# Patient Record
Sex: FEMALE | Race: WHITE | Employment: FULL TIME | URBAN - METROPOLITAN AREA
[De-identification: names, ages, dates, MRNs, and addresses within clinical notes are randomized per-mention and may not be internally consistent; named-entity substitution may affect disease eponyms.]

---

## 2020-06-17 ENCOUNTER — OFFICE VISIT (OUTPATIENT)
Dept: NEUROLOGY | Age: 65
End: 2020-06-17

## 2020-06-17 VITALS
BODY MASS INDEX: 29.23 KG/M2 | SYSTOLIC BLOOD PRESSURE: 140 MMHG | HEIGHT: 63 IN | OXYGEN SATURATION: 97 % | DIASTOLIC BLOOD PRESSURE: 80 MMHG | WEIGHT: 165 LBS | RESPIRATION RATE: 18 BRPM | HEART RATE: 64 BPM

## 2020-06-17 DIAGNOSIS — M54.31 SCIATICA, RIGHT SIDE: Primary | ICD-10-CM

## 2020-06-17 NOTE — PATIENT INSTRUCTIONS
PRESCRIPTION REFILL POLICY [facility] Statement to Patients April 1, 2014 In an effort to ensure the large volume of patient prescription refills is processed in the most efficient and expeditious manner, we are asking our patients to assist us by calling your Pharmacy for all prescription refills, this will include also your  Mail Order Pharmacy. The pharmacy will contact our office electronically to continue the refill process. Please do not wait until the last minute to call your pharmacy. We need at least 48 hours (2days) to fill prescriptions. We also encourage you to call your pharmacy before going to  your prescription to make sure it is ready. With regard to controlled substance prescription refill requests (narcotic refills) that need to be picked up at our office, we ask your cooperation by providing us with at least 72 hours (3days) notice that you will need a refill. We will not refill narcotic prescription refill requests after 4:00pm on any weekday, Monday through Thursday, or after 2:00pm on Fridays, or on the weekends. We encourage everyone to explore another way of getting your prescription refill request processed using Unda, our patient web portal through our electronic medical record system. Unda is an efficient and effective way to communicate your medication request directly to the office and  downloadable as an marti on your smart phone . Unda also features a review functionality that allows you to view your medication list as well as leave messages for your physician. Are you ready to get connected? If so please review the attatched instructions or speak to any of our staff to get you set up right away! Thank you so much for your cooperation. Should you have any questions please contact our Practice Administrator. The Physicians and Staff,  [facility]

## 2020-06-17 NOTE — PROGRESS NOTES
Ms. Teresa Kenyon presents as a new patient for evaluation of bilateral lower extremity pain/numbness and back pain. Depression screening done on patient.

## 2020-06-17 NOTE — PROGRESS NOTES
Chief Complaint   Patient presents with    Neurologic Problem         HISTORY OF PRESENT ILLNESS  Corina Cannon is a 71 y.o. female came in for neurological evaluation regarding low back pain that is radiating down into the right lower extremity. She started noticing it about 6 weeks ago as she was mowing her grass. Initially it was localized to the hip region but then it progressed on and then she developed back pain. Bending over aggravates the pain. She has been walking somewhat slower and at times tends to drag her right leg because of the discomfort. Denies any numbness in the leg. She had x-ray and was given a course of Medrol which helped for a few days but then the pain came back. She has also been doing home exercises for the past 4 weeks but she finds them quite painful and have not helped. Past Medical History:   Diagnosis Date    Chronic kidney disease     kidney stone    GERD (gastroesophageal reflux disease)     Other ill-defined conditions(489.83)     environmental allergies    Other ill-defined conditions(809.19)     guillain [place]    Thyroid disease      Current Outpatient Medications   Medication Sig    montelukast (SINGULAIR) 10 mg tablet     ibuprofen (MOTRIN) 600 mg tablet Take 1 Tab by mouth every six (6) hours as needed for Pain.  levothyroxine (LEVOXYL) 50 mcg tablet Take 50 mcg by mouth Daily (before breakfast).  traZODone (DESYREL) 100 mg tablet Take 100 mg by mouth nightly.  fluticasone (FLONASE) 50 mcg/actuation nasal spray 2 Sprays by Both Nostrils route daily.  b complex vitamins tablet Take 1 Tab by mouth daily.  fexofenadine (ALLEGRA) 180 mg tablet Take 180 mg by mouth daily.  ALPRAZolam (XANAX) 0.5 mg tablet      No current facility-administered medications for this visit.       No Known Allergies  Family History   Problem Relation Age of Onset    Hypertension Mother     Kidney Disease Mother         stone    Heart Disease Brother atrial fibrillation    Hypertension Brother     Other Father         GSW      Social History     Tobacco Use    Smoking status: Never Smoker    Smokeless tobacco: Never Used   Substance Use Topics    Alcohol use: Yes     Comment: once a yr at most    Drug use: No     Past Surgical History:   Procedure Laterality Date    HX CHOLECYSTECTOMY      HX HEENT      oral surgery to pull teeth    HX ORTHOPAEDIC      ORIF left ankle - plate and screws         REVIEW OF SYSTEMS  Review of Systems - History obtained from the patient  Psychological ROS: negative  ENT ROS: negative  Hematological and Lymphatic ROS: negative  Endocrine ROS: negative  Respiratory ROS: no cough, shortness of breath, or wheezing  Cardiovascular ROS: no chest pain or dyspnea on exertion  Gastrointestinal ROS: no abdominal pain, change in bowel habits, or black or bloody stools  Genito-Urinary ROS: no dysuria, trouble voiding, or hematuria  Musculoskeletal ROS: negative  Dermatological ROS: negative      PHYSICAL EXAMINATION:    Visit Vitals  /80   Pulse 64   Resp 18   Ht 5' 3\" (1.6 m)   Wt 74.8 kg (165 lb)   SpO2 97%   BMI 29.23 kg/m²     General:  Well nourished and groomed individual in no acute distress. Neck: Supple, nontender, no bruits, no pain with resistance to active range of motion. Heart: Regular rate and rhythm. Normal S1S2. Lungs:  Equal chest expansion, no cough, no wheeze  Musculoskeletal:  Extremities revealed no edema and had full range of motion of joints. Psych:  Good mood and bright affect    NEUROLOGICAL EXAMINATION:     Mental Status:   Alert and oriented to person, place, and time with recent and remote memory intact. Attention span and concentration are normal. Speech is fluent. Cranial Nerves:    II, III, IV, VI:  Visual acuity grossly intact. Visual fields are normal.    Pupils are equal, round, and reactive to light and accommodation. Extra-ocular movements are full and fluid. Fundoscopic exam was benign, no ptosis or nystagmus. V-XII: Hearing is grossly intact. Facial features are symmetric, with normal sensation and strength. The palate rises symmetrically and the tongue protrudes midline. Sternocleidomastoids 5/5. Motor Examination: Normal tone, bulk, and strength. 5/5 muscle strength throughout. Right big toe extensors were weaker compared to the left. No cogwheel rigidity or clonus present. Sensory exam:  Normal throughout to pinprick, temperature, and vibration sense. Normal proprioception. Coordination:  Finger to nose and rapid arm movement testing was normal.   No resting or intention tremor    Gait and Station:  Steady but slow. She was able to walk on her toes but not on her heels. Normal arm swing. No Rhomberg or pronator drift. No muscle wasting or fasiculations noted. Reflexes:  DTRs 2+ throughout. Toes downgoing. Slight leg raise test was positive on the right      LABS / IMAGING  X-rays of the lower back were reportedly negative    ASSESSMENT    ICD-10-CM ICD-9-CM    1. Sciatica, right side M54.31 724.3 MRI LUMB SPINE WO CONT       DISCUSSION  Ms. Latrell Mehta has symptoms of sciatica on the right side with mild weakness of the big toe extensors and may have a radiculopathy at L5 level. Since she has had x-rays, tried home PT and even took a course of Medrol Dosepaks without any benefit, I have recommended MRI scan of the lumbar spine for further evaluation and to decide the course for treatment      Jorge Lopez MD  Diplomate, American Board of Psychiatry & Neurology (Neurology)  Diplomate, American Board of Psychiatry & Neurology (Clinical Neurophysiology)  Diplomate, American Board of Electrodiagnostic Medicine  This note will not be viewable in [address]

## 2020-06-19 ENCOUNTER — HOSPITAL ENCOUNTER (OUTPATIENT)
Dept: MRI IMAGING | Age: 65
Discharge: HOME | End: 2020-06-19
Attending: PSYCHIATRY & NEUROLOGY
Payer: MEDICARE

## 2020-06-19 DIAGNOSIS — M54.31 SCIATICA, RIGHT SIDE: ICD-10-CM

## 2020-06-19 PROCEDURE — 72148 MRI LUMBAR SPINE W/O DYE: CPT

## 2020-06-23 ENCOUNTER — TELEPHONE (OUTPATIENT)
Dept: NEUROLOGY | Age: 65
End: 2020-06-23

## 2020-06-23 NOTE — TELEPHONE ENCOUNTER
----- Message from Tao Meyer sent at 2020 12:34 PM EDT -----  Regarding: JOSE/TELEPHONE  Contact: [phone]  Return Call    Patient's first and last name: Scarlett Barger  : 1955  ID numbers:  [id number]    Caller's first and last name and relationship (if not the patient):   Best contact number(s): [phone]  Whose call is being returned: Unknown  Details to clarify the request: Patient returning a missed call regarding MRI results.

## 2020-06-24 DIAGNOSIS — M54.31 SCIATICA, RIGHT SIDE: Primary | ICD-10-CM

## 2020-06-24 RX ORDER — METHYLPREDNISOLONE 4 MG/1
TABLET ORAL
Qty: 1 DOSE PACK | Refills: 0 | Status: SHIPPED | OUTPATIENT
Start: 2020-06-24

## 2020-06-24 RX ORDER — DICLOFENAC SODIUM 50 MG/1
50 TABLET, DELAYED RELEASE ORAL
Qty: 30 TAB | Refills: 0 | Status: SHIPPED | OUTPATIENT
Start: 2020-06-24

## 2020-07-07 ENCOUNTER — HOSPITAL ENCOUNTER (OUTPATIENT)
Dept: PHYSICAL THERAPY | Age: 65
Discharge: HOME | End: 2020-07-07
Payer: MEDICARE

## 2020-07-07 PROCEDURE — 97110 THERAPEUTIC EXERCISES: CPT | Performed by: PHYSICAL THERAPIST

## 2020-07-07 PROCEDURE — 97161 PT EVAL LOW COMPLEX 20 MIN: CPT | Performed by: PHYSICAL THERAPIST

## 2020-07-07 NOTE — PROGRESS NOTES
PT INITIAL EVALUATION NOTE - [facility] 2-15    Patient Name: Leticia Sow  Date:2020  : 1955  [x]  Patient  Verified  Payor: VA MEDICARE / Plan: VA MEDICARE PART A & B / Product Type: Medicare /    In time:1130a  Out time: 1235p  Total Treatment Time (min): 65  Total Timed Codes (min): 10  1:1 Treatment Time ( [address] only): 10   Visit #: 1     Treatment Area: Right sided sciatica [M54.31]    SUBJECTIVE  Pain Level (0-10 scale): 6  Any medication changes, allergies to medications, adverse drug reactions, diagnosis change, or new procedure performed?: [] No    [x] Yes (see summary sheet for update)  Subjective:    Pt reports with low back and R LE pain that began back in mid-May. She was doing yardwork at the time and was bending over repeatedly to  sticks, and began to notice hip pain. It has progressed since then and she has back and R LE pain. Reports her R LE pain is continuing to worsen at this time. She is a resident of [facility], but is still here in [place] in part in order to get medical care. Previous medical history of [place] back in . Does has some residual numbness in her toes from her [place] and has noticed that when she is fatigued, she has R LE foot drop/dragging. This predates her low back/R LE symptoms.      Description of symptoms: central/R low back pain, R lateral LE pain   Aggravating Factors: sitting>standing  Alleviating Factors: getting up/walking around    Radiation: lateral thigh and lower leg pain, follows L5 dermatome    Patient reports functional limitations with:     OBJECTIVE/EXAMINATION  Posture:  Mild lateral shift to L  Other Observations:  Slow movement, guarded with bed mobility  Palpation: No specific TTP        Lumbar AROM:          R   L    Flexion    50% limited, pain R hip    Extension   50% limited, no pain      Side Bending   To knee  To knee, R sided tightness        LOWER QUARTER   MUSCLE STRENGTH  KEY       R  L  0 - No Contraction  L1, L2 Psoas  4+  4+  1 - Trace   L3 Quads  5  5  2 - Poor   L4 Tib Ant  5  4+  3 - Fair    L5 EHL  4  5  4 - Good   S1 Peroneals  5  5  5 - Normal   S2 Hams  5  5    Mobility Assessment: lumbar P-A mobility: mild hypomobility generally, increased dorsal foot pain with L5 P-A mob GIII     Lumbar rotational mobility: hypomobile, guarded, hip pain with generalized right rotation  MMT: R hip              HIP ER: 4+/5            Neurological: Sensations: intact to light touch  Special Tests: Forward Bend: limited, painful R LE       H.S. SLR: (+) for LE tightness @60deg hip flexion (popliteal space)    Slump: (+) for popliteal space 'tightness'      SI distraction: (+) for reduced pain   Sidelying gapping stretching (+) for reduced pain   Prone position: no increases in pain    10 min Therapeutic Exercise:  [x] See flow sheet :   Rationale: increase ROM to improve the patients ability to move with reduced pain    With   [] TE   [] TA   [] neuro   [] other: Patient Education: [x] Review HEP    [] Progressed/Changed HEP based on:   [] positioning   [] body mechanics   [] transfers   [] heat/ice application    [] other:      Other Objective/Functional Measures:  FOTO: unavailable    Pain Level (0-10 scale) post treatment: 6    ASSESSMENT/Changes in Function:     [x]  See Plan of HILLARY Rao DPT 7/7/2020

## 2020-07-07 NOTE — PROGRESS NOTES
[facility]  [address] [address]  Phone: [phone]  Fax: [phone]      Plan of Care/Statement of Necessity for Physical Therapy Services  2-15    Patient name: Maite Ricardo  : 1955  Provider#: [id number]  Referral source: Judi Bermudez MD      Medical/Treatment Diagnosis: Right sided sciatica [M54.31]     Prior Hospitalization: see medical history     Comorbidities: kidney stones, Guillain-[place]  Prior Level of Function: able to perform yardwork without issue  Medications: Verified on Patient Summary List  Start of Care: 2020      Onset Date: May 2020   The [address] [place] [address] and following information is based on the information from the initial evaluation. Assessment/ key information: Patient reports with symptoms indicative of lumbar radiculopathy affecting L5 nerve root. Exhibits an extension bias, and will work on lumbar mobility/decompression techniques to mitigate LE symptoms.      Evaluation Complexity History MEDIUM  Complexity : 1-2 comorbidities / personal factors will impact the outcome/ POC ; Examination HIGH Complexity : 4+ Standardized tests and measures addressing body structure, function, activity limitation and / or participation in recreation  ;Presentation LOW Complexity : Stable, uncomplicated   Overall Complexity Rating: LOW     Problem List: pain affecting function, decrease ROM, decrease strength, decrease ADL/ functional abilitiies, decrease activity tolerance and decrease flexibility/ joint mobility   Treatment Plan may include any combination of the following: Therapeutic exercise, Therapeutic activities, Neuromuscular re-education, Physical agent/modality, Manual therapy, Patient education and Self Care training  Patient / Family readiness to learn indicated by: asking questions and interest  Persons(s) to be included in education: patient (P)  Barriers to Learning/Limitations: None  Patient Goal (s): decrease pain  Patient Self Reported Health Status: good  Rehabilitation Potential: fair    Long Term Goals: To be accomplished in 10-12 treatments:   1. Pt will be able to perform light yardwork without increase in pain   2. Pt will be able to sit for at least 30 minutes without increase in pain   3. Pt will be able to stand at least 30 minutes without increase in pain   4. Pt will be able to self-manage care using updated HEP for improved independence  Frequency / Duration: Patient to be seen 2 times per week for 6-8 weeks. Patient/ Caregiver education and instruction: self care and exercises    [x]  Plan of care has been reviewed with PTA      Certification Period: 7/7/2020-10/7/2020  Savita Tijerina PT, DPT 7/7/2020     ________________________________________________________________________    I certify that the above Therapy Services are being furnished while the patient is under my care. I agree with the treatment plan and certify that this therapy is necessary.     [de-identified] Signature:____________________  Date:____________Time: _________

## 2020-07-10 ENCOUNTER — HOSPITAL ENCOUNTER (OUTPATIENT)
Dept: PHYSICAL THERAPY | Age: 65
Discharge: HOME | End: 2020-07-10
Payer: MEDICARE

## 2020-07-10 PROCEDURE — 97110 THERAPEUTIC EXERCISES: CPT | Performed by: PHYSICAL THERAPIST

## 2020-07-10 PROCEDURE — 97140 MANUAL THERAPY 1/> REGIONS: CPT | Performed by: PHYSICAL THERAPIST

## 2020-07-10 NOTE — PROGRESS NOTES
PT DAILY TREATMENT NOTE - [facility] 2-15    Patient Name: Gage Cunningham  Date:7/10/2020  : 1955  [x]  Patient  Verified  Payor: Vikash Price / Plan: VA MEDICARE PART A & B / Product Type: Medicare /    In time: 360B  Out time: 1020a  Total Treatment Time (min): 48  Total Timed Codes (min): 48  1:1 Treatment Time ( only): 48   Visit #:  2    Treatment Area: Right sided sciatica [M54.31]    SUBJECTIVE  Pain Level (0-10 scale): 5  Any medication changes, allergies to medications, adverse drug reactions, diagnosis change, or new procedure performed?: [x] No    [] Yes (see summary sheet for update)  Subjective functional status/changes:   [] No changes reported  Doing okay today. No big changes; exercises don't make her worse. OBJECTIVE     15 min Therapeutic Exercise:  [x] See flow sheet :   Rationale: increase ROM and increase strength to improve the patients ability to walk without pain    33 min Manual Therapy: prone sacral float, GII-III lumbar P-A mobs L4-5, sidelying lumbar rotation stretching (pillow under side), sidelying lumbar gapping stretch (pillow under side), lumbar traction with belt, long limb hip distraction, STM/MFR to piriforimis   Rationale: decrease pain, increase ROM, increase tissue extensibility and decrease trigger points to improve the patients ability to walk without pain          With   [] TE   [] TA   [] neuro   [] other: Patient Education: [x] Review HEP    [] Progressed/Changed HEP based on:   [] positioning   [] body mechanics   [] transfers   [] heat/ice application    [] other:      Other Objective/Functional Measures: --     Pain Level (0-10 scale) post treatment: 5    ASSESSMENT/Changes in Function:   Showed improved symptoms with treatment today, including reduced low back and lower leg radicular pain. Did report some increased hip pain upon standing from prolonged lying on table. Will focus on decompression/stretching and gentle lumbar extensions per tolerance.    Patient will continue to benefit from skilled PT services to modify and progress therapeutic interventions, address functional mobility deficits, address ROM deficits, address strength deficits, analyze and address soft tissue restrictions and analyze and cue movement patterns to attain remaining goals. []  See Plan of Care  []  See progress note/recertification  []  See Discharge Summary         Progress towards goals / Updated goals:  Long Term Goals:  To be accomplished in 10-12 treatments:              1. Pt will be able to perform light yardwork without increase in pain              2. Pt will be able to sit for at least 30 minutes without increase in pain              3. Pt will be able to stand at least 30 minutes without increase in pain              4. Pt will be able to self-manage care using updated HEP for improved independence    PLAN  [x]  Upgrade activities as tolerated     [x]  Continue plan of care  []  Update interventions per flow sheet       []  Discharge due to:_  []  Other:_      Meredith Ashford, PT, DPT 7/10/2020

## 2020-07-14 ENCOUNTER — HOSPITAL ENCOUNTER (OUTPATIENT)
Dept: PHYSICAL THERAPY | Age: 65
Discharge: HOME | End: 2020-07-14
Payer: MEDICARE

## 2020-07-14 PROCEDURE — 97110 THERAPEUTIC EXERCISES: CPT | Performed by: PHYSICAL THERAPIST

## 2020-07-14 PROCEDURE — 97140 MANUAL THERAPY 1/> REGIONS: CPT | Performed by: PHYSICAL THERAPIST

## 2020-07-14 NOTE — PROGRESS NOTES
PT DAILY TREATMENT NOTE - [facility] 2-15    Patient Name: Tana Kraft  Date:2020  : 1955  [x]  Patient  Verified  Payor: Yari Lewis / Plan: VA MEDICARE PART A & B / Product Type: Medicare /    In time: 566H  Out time: 1030a  Total Treatment Time (min): 55  Total Timed Codes (min): 40  1:1 Treatment Time ([facility] only): 40   Visit #:  3    Treatment Area: Right sided sciatica [M54.31]    SUBJECTIVE  Pain Level (0-10 scale): 7  Any medication changes, allergies to medications, adverse drug reactions, diagnosis change, or new procedure performed?: [x] No    [] Yes (see summary sheet for update)  Subjective functional status/changes:   [] No changes reported  Pain has been worse since Friday. Just feels like it is more irritated. Says the exercises hurt, but don't make it worse. Wonders if she is doing them wrong. OBJECTIVE  Modality rationale: decrease pain and increase tissue extensibility to improve the patients ability to move without pain     Min Type Additional Details        []????????? Estim: []??????? ?? Att   []????????? Unatt    []?????????TENS instruct                  []?????????IFC  []????????? Premod   []?????????NMES                     []????????? Other:  []?????????w/US   []?????????w/ice   []?????????w/heat  Position:  Location:       []?????????  Traction: []????????? Cervical       []?????????Lumbar                       []????????? Prone          []????????? Supine                       []??????? ? ? Intermittent   []????????? Continuous Lbs:   []????????? before manual  []????????? after manual  []?????????w/heat     []?????????  Ultrasound: []????????? Continuous   []????????? Pulsed                       at: []?????????1MHz   []?????????3MHz Location:  W/cm2:     []????????? Paraffin     Location:   []?????????w/heat    10 []?????????  Ice     [x]?????????  Heat  []?????????  Ice massage Position: sidelying  Location: lumbar/hip     []?????????  Laser  []?????????  Other: Position:  Location:      []?????????  Vasopneumatic Device Pressure:       []????????? lo []????????? med []????????? hi   Temperature:       [x]????????? Skin assessment post-treatment:  [x]?????????intact []?????????redness- no adverse reaction    []?????????redness  adverse reaction:      25 min Therapeutic Exercise:  [x]? See flow sheet :   Rationale: increase ROM and increase strength to improve the patients ability to walk without pain     15 min Manual Therapy:  sidelying lumbar rotation stretching (pillow under side), sidelying lumbar gapping stretch (pillow under side)    HELD TODAY:  prone sacral float, GII-III lumbar P-A mobs L4-5   lumbar traction with belt, long limb hip distraction, STM/MFR to piriforimis   Rationale: decrease pain, increase ROM, increase tissue extensibility and decrease trigger points to improve the patients ability to walk without pain                                                                 With   []? TE   []? TA   []? neuro   []? other: Patient Education: [x]? Review HEP    []? Progressed/Changed HEP based on:   []? positioning   []? body mechanics   []? transfers   []? heat/ice application    []? other:       Other Objective/Functional Measures: --        Pain Level (0-10 scale) post treatment: 5     ASSESSMENT/Changes in Function:   Still difficult to discern which positions/stretches help lorenzo Tubbs radicular symptoms. Did state she felt better after modalities today. Patient will continue to benefit from skilled PT services to modify and progress therapeutic interventions, address functional mobility deficits, address ROM deficits, address strength deficits, analyze and address soft tissue restrictions and analyze and cue movement patterns to attain remaining goals. []? See Plan of Care  []? See progress note/recertification  []? See Discharge Summary         Progress towards goals / Updated goals:  Long Term Goals: To be accomplished in 10-12 treatments:              1.  Pt will be able to perform light yardwork without increase in pain              2. Pt will be able to sit for at least 30 minutes without increase in pain              3. Pt will be able to stand at least 30 minutes without increase in pain NOT MET              4. Pt will be able to self-manage care using updated HEP for improved independence     PLAN  [x]? Upgrade activities as tolerated     [x]? Continue plan of care  []? Update interventions per flow sheet       []? Discharge due to:_  []?   Other:_        Ashleigh Muñoz PT, DPT 7/14/2020

## 2020-07-16 ENCOUNTER — HOSPITAL ENCOUNTER (OUTPATIENT)
Dept: PHYSICAL THERAPY | Age: 65
Discharge: HOME | End: 2020-07-16
Payer: MEDICARE

## 2020-07-16 PROCEDURE — 97140 MANUAL THERAPY 1/> REGIONS: CPT | Performed by: PHYSICAL THERAPIST

## 2020-07-16 PROCEDURE — 97110 THERAPEUTIC EXERCISES: CPT | Performed by: PHYSICAL THERAPIST

## 2020-07-16 NOTE — PROGRESS NOTES
PT DAILY TREATMENT NOTE - [facility] 2-15    Patient Name: Juan Carlos Carreon  Date:2020  : 1955  [x]  Patient  Verified  Payor: Rubin Bloch / Plan: [place] / Product Type: Medicare /    In time:102  Out time:1116a  Total Treatment Time (min): 54  Total Timed Codes (min): 44  1:1 Treatment Time ( [address] only): 40   Visit #: 4    Treatment Area: Right sided sciatica [M54.31]    SUBJECTIVE  Pain Level (0-10 scale): 6  Any medication changes, allergies to medications, adverse drug reactions, diagnosis change, or new procedure performed?: [x] No    [] Yes (see summary sheet for update)  Subjective functional status/changes:   [] No changes reported  Doing slightly better today. Noticing there is less of her top of the foot pain since last visit. OBJECTIVE    Modality rationale: decrease pain and increase tissue extensibility to improve the patients ability to move without pain     Min Type Additional Details        []?????????? Estim: []???????? ?? Att   []?????????? Unatt    []??????????TENS instruct                  []??????????IFC  []?????????? Premod   []??????????NMES                     []?????????? Other:  []??????????w/US   []??????????w/ice   []??????????w/heat  Position:  Location:        []??????????  Traction: []?????????? Cervical       []??????????Lumbar                       []?????????? Prone          []?????????? Supine                       []???????? ? ? Intermittent   []?????????? Continuous Lbs:   []?????????? before manual  []?????????? after manual  []??????????w/heat     []??????????  Ultrasound: []?????????? Continuous   []?????????? Pulsed                       at: []??????????1MHz   []??????????3MHz Location:  W/cm2:     []?????????? Paraffin     Location:   []??????????w/heat    10 []??????????  Ice     [x]??????????  Heat  []??????????  Ice massage Position: prone  Location: lumbar/hip     []??????????  Laser  []??????????  Other: Position:  Location:        []??????????  Vasopneumatic Device Pressure:       []?????????? lo []?????????? med []?????????? hi   Temperature:       [x]?????????? Skin assessment post-treatment:  [x]??????????intact []??????????redness- no adverse reaction    []??????????redness  adverse reaction:      19 min Therapeutic Exercise:  [x]? ? See flow sheet :   Rationale: increase ROM and increase strength to improve the patients ability to walk without pain     25 min Manual Therapy:  sidelying lumbar rotation stretching, sidelying lumbar gapping stretch. Low level sciatic nerve glides, long limb distraction (in neutral hip position)     HELD TODAY:  prone sacral float, GII-III lumbar P-A mobs L4-5, STM/MFR to piriforimis   Rationale: decrease pain, increase ROM, increase tissue extensibility and decrease trigger points to improve the patients ability to walk without pain                                                                 With   []?? TE   []?? TA   []?? neuro   []?? other: Patient Education: [x]? ? Review HEP    []? ? Progressed/Changed HEP based on:   []?? positioning   []? ? body mechanics   []? ? transfers   []? ? heat/ice application    []? ? other:       Other Objective/Functional Measures: --        Pain Level (0-10 scale) post treatment: 7     ASSESSMENT/Changes in Function:   Initially reported some reduced foot pain with long limb distraction today. Also reported some mild hip pain relief with sidelying sacral float. Otherwise, difficult to denote any progress in today's session. Deep palpation to L hip doesn't reveal any excessive TTP. As prone position reduces her symptoms, attempted to lie in prone for heat today. Patient reported upon conclusion of session she was having more pain than when she came in, but difficult to assess whether prone positioning played a positve or negative role for her today.  If still not improving next week, will refer back to MD.   Patient will continue to benefit from skilled PT services to modify and progress therapeutic interventions, address functional mobility deficits, address ROM deficits, address strength deficits, analyze and address soft tissue restrictions and analyze and cue movement patterns to attain remaining goals.     []? ?  See Plan of Care  []? ?  See progress note/recertification  []? ?  See Discharge Summary         Progress towards goals / Updated goals:  Long Term Goals: To be accomplished in 10-12 treatments:              1. Pt will be able to perform light yardwork without increase in pain              2. Pt will be able to sit for at least 30 minutes without increase in pain              3. Pt will be able to stand at least 30 minutes without increase in pain NOT MET              4. Pt will be able to self-manage care using updated HEP for improved independence     PLAN  [x]? ?  Upgrade activities as tolerated     [x]? ?  Continue plan of care  []? ?  Update interventions per flow sheet       []? ?  Discharge due to:_  []??  Other:_        Bentley Almanzar PT, DPT 7/16/2020

## 2020-07-21 ENCOUNTER — APPOINTMENT (OUTPATIENT)
Dept: PHYSICAL THERAPY | Age: 65
End: 2020-07-21
Payer: MEDICARE

## 2020-07-23 ENCOUNTER — HOSPITAL ENCOUNTER (OUTPATIENT)
Dept: PHYSICAL THERAPY | Age: 65
Discharge: HOME | End: 2020-07-23
Payer: MEDICARE

## 2020-07-23 PROCEDURE — 97140 MANUAL THERAPY 1/> REGIONS: CPT | Performed by: PHYSICAL THERAPIST

## 2020-07-23 PROCEDURE — 97110 THERAPEUTIC EXERCISES: CPT | Performed by: PHYSICAL THERAPIST

## 2020-07-23 NOTE — PROGRESS NOTES
PT DAILY TREATMENT NOTE - [facility] 2-15    Patient Name: Waldemar Drop  Date:2020  : 1955  [x]  Patient  Verified  Payor: [place] MEDICARE / Plan: [place] MEDICARE PART A & B / Product Type: Medicare /    In time: 863I  Out time: 1030a  Total Treatment Time (min): 60  Total Timed Codes (min): 50  1:1 Treatment Time ( only): 45   Visit #: 5    Treatment Area: Right sided sciatica [M54.31]    SUBJECTIVE  Pain Level (0-10 scale): 5  Any medication changes, allergies to medications, adverse drug reactions, diagnosis change, or new procedure performed?: [x] No    [] Yes (see summary sheet for update)  Subjective functional status/changes:   [] No changes reported  Lower leg pain is under control and not bothering her much. Says that her hip pain is in the same spot (gluteal fold) and mostly as bad as before. Has noticed that she is now able to get up and down the stairs more easily. OBJECTIVE    Modality rationale: decrease pain and increase tissue extensibility to improve the patients ability to move without pain     Min Type Additional Details        []??????????? Estim: []????????? ?? Att   []??????????? Unatt    []???????????TENS instruct                  []???????????IFC  []??????????? Premod   []???????????NMES                     []??????????? Other:  []???????????w/US   []???????????w/ice   []???????????w/heat  Position:  Location:        []???????????  Traction: []??????????? Cervical       []???????????Lumbar                       []??????????? Prone          []??????????? Supine                       []????????? ? ? Intermittent   []??????????? Continuous Lbs:   []??????????? before manual  []??????????? after manual  []???????????w/heat     []???????????  Ultrasound: []??????????? Continuous   []??????????? Pulsed                       at: []???????????1MHz   []???????????3MHz Location:  W/cm2:     []??????????? Paraffin     Location:   []???????????w/heat    10 []???????????  Ice     [x]???????????  Heat  []???????????  Ice massage Position: prone  Location: lumbar/hip     []???????????  Laser  []???????????  Other: Position:  Location:        []???????????  Vasopneumatic Device Pressure:       []??????????? lo []??????????? med []??????????? hi   Temperature:       [x]??????????? Skin assessment post-treatment:  [x]???????????intact []???????????redness- no adverse reaction    []???????????redness  adverse reaction:      20 min Therapeutic Exercise:  [x]? ?? See flow sheet :   Rationale: increase ROM and increase strength to improve the patients ability to walk without pain     25 min Manual Therapy:  sidelying lumbar rotation stretching, sidelying lumbar gapping stretch, long limb distraction, prone sacral float, GII-III lumbar P-A mobs L4-5, STM/MFR to piriforimis     HELD TODAY:   Low level sciatic nerve glides   Rationale: decrease pain, increase ROM, increase tissue extensibility and decrease trigger points to improve the patients ability to walk without pain                                                                 With   []??? TE   []??? TA   []??? neuro   []? ?? other: Patient Education: [x]??? Review HEP    []? ?? Progressed/Changed HEP based on:   []??? positioning   []? ?? body mechanics   []? ?? transfers   []? ?? heat/ice application    []? ?? other:       Other Objective/Functional Measures: --        Pain Level (0-10 scale) post treatment: 4     ASSESSMENT/Changes in Function:   Does seem to be making some progress overall. Of note today is that symptoms seem to be exacerbated with palpation and gentle stretching to proximal hamstrings/lower glut region. Unclear if this is just muscle guarding/spasm in response to pain, or a source of symptoms in and of themselves.    Patient will continue to benefit from skilled PT services to modify and progress therapeutic interventions, address functional mobility deficits, address ROM deficits, address strength deficits, analyze and address soft tissue restrictions and analyze and cue movement patterns to attain remaining goals.     []? ??  See Plan of Care  []? ??  See progress note/recertification  []? ??  See Discharge Summary         Progress towards goals / Updated goals:  Long Term Goals: To be accomplished in 10-12 treatments:              1. Pt will be able to perform light yardwork without increase in pain              2. Pt will be able to sit for at least 30 minutes without increase in pain              3. Pt will be able to stand at least 30 minutes without increase in pain NOT MET              4. Pt will be able to self-manage care using updated HEP for improved independence     PLAN  [x]???  Upgrade activities as tolerated     [x]? ??  Continue plan of care  []? ??  Update interventions per flow sheet       []???  Discharge due to:_  []???  Other:_      Zcak Mckeon, PT, DPT 7/23/2020

## 2020-07-30 ENCOUNTER — HOSPITAL ENCOUNTER (OUTPATIENT)
Dept: PHYSICAL THERAPY | Age: 65
Discharge: HOME | End: 2020-07-30
Payer: MEDICARE

## 2020-07-30 ENCOUNTER — TELEPHONE (OUTPATIENT)
Dept: NEUROLOGY | Age: 65
End: 2020-07-30

## 2020-07-30 PROCEDURE — 97140 MANUAL THERAPY 1/> REGIONS: CPT | Performed by: PHYSICAL THERAPIST

## 2020-07-30 PROCEDURE — 97110 THERAPEUTIC EXERCISES: CPT | Performed by: PHYSICAL THERAPIST

## 2020-07-30 NOTE — PROGRESS NOTES
PT DAILY TREATMENT NOTE - [facility] 2-15    Patient Name: Kari Adkins  Date:2020  : 1955  [x]  Patient  Verified  Payor: John Dickey / Plan: [place] MEDICARE PART A & B / Product Type: Medicare /    In time: 7017E  Out time: 1135a  Total Treatment Time (min): 40  Total Timed Codes (min): 40  1:1 Treatment Time ( [address] only): 40   Visit #:  6    Treatment Area: Right sided sciatica [M54.31]    SUBJECTIVE  Pain Level (0-10 scale): 6  Any medication changes, allergies to medications, adverse drug reactions, diagnosis change, or new procedure performed?: [x] No    [] Yes (see summary sheet for update)  Subjective functional status/changes:   [] No changes reported  Did fair over her trip to [place], but was watering plants yesterday and drove back and has been having more pain since then. Did heat her gluteal region this morning and that seemed to help. OBJECTIVE    Modality rationale: decrease pain and increase tissue extensibility to improve the patients ability to move without pain     Min Type Additional Details        []???????????? Estim: []?????????? ?? Att   []???????????? Unatt    []????????????TENS instruct                  []????????????IFC  []???????????? Premod   []????????????NMES                     []???????????? Other:  []????????????w/US   []????????????w/ice   []????????????w/heat  Position:  Location:        []????????????  Traction: []???????????? Cervical       []????????????Lumbar                       []???????????? Prone          []???????????? Supine                       []?????????? ? ? Intermittent   []???????????? Continuous Lbs:   []???????????? before manual  []???????????? after manual  []????????????w/heat     []????????????  Ultrasound: []???????????? Continuous   []???????????? Pulsed                       at: []????????????1MHz   []????????????3MHz Location:  W/cm2:     []???????????? Paraffin     Location:   []????????????w/heat   declined []????????????  Ice     [x]????????????  Heat  []????????????  Ice massage Position: prone  Location: lumbar/hip     []????????????  Laser  []????????????  Other: Position:  Location:        []????????????  Vasopneumatic Device Pressure:       []???????????? lo []???????????? med []???????????? hi   Temperature:       [x]???????????? Skin assessment post-treatment:  [x]????????????intact []????????????redness- no adverse reaction    []????????????redness  adverse reaction:      15 min Therapeutic Exercise:  [x]? ??? See flow sheet :   Rationale: increase ROM and increase strength to improve the patients ability to walk without pain     25 min Manual Therapy:  sidelying lumbar rotation stretching, sidelying lumbar gapping stretch, prone sacral float, GII-III lumbar P-A mobs L4-5, STM/MFR to paraspinals     HELD TODAY:   Low level sciatic nerve glides, long limb distraction,   Rationale: decrease pain, increase ROM, increase tissue extensibility and decrease trigger points to improve the patients ability to walk without pain                                                                 With   []???? TE   []???? TA   []???? neuro   []???? other: Patient Education: [x]???? Review HEP    []???? Progressed/Changed HEP based on:   []???? positioning   []???? body mechanics   []???? transfers   []???? heat/ice application    []???? other:       Other Objective/Functional Measures: --        Pain Level (0-10 scale) post treatment: 6     ASSESSMENT/Changes in Function:   Progress is limited, though there does seem to be a very mild change overall. Still not sufficient to warrant continuing with current plan of care. Will reach out to MD regarding further examination/further treatment options.    Patient will continue to benefit from skilled PT services to modify and progress therapeutic interventions, address functional mobility deficits, address ROM deficits, address strength deficits, analyze and address soft tissue restrictions and analyze and cue movement patterns to attain remaining goals.     []????  See Plan of Care  []????  See progress note/recertification  []????  See Discharge Summary         Progress towards goals / Updated goals:  Long Term Goals: To be accomplished in 10-12 treatments:              1. Pt will be able to perform light yardwork without increase in pain              2. Pt will be able to sit for at least 30 minutes without increase in pain              3. Pt will be able to stand at least 30 minutes without increase in pain NOT MET              4. Pt will be able to self-manage care using updated HEP for improved independence     PLAN  [x]????  Upgrade activities as tolerated     [x]? ???  Continue plan of care  []????  Update interventions per flow sheet       []????  Discharge due to:_  []????  Other:_        Ashleigh Muñoz PT, DPT 7/30/2020

## 2020-07-30 NOTE — TELEPHONE ENCOUNTER
----- Message from Ernestine Cartwright MD sent at 7/30/2020 12:33 PM EDT -----  Regarding: RE: Physical Therapy progress  Thank you for the update Domenica Lindsay. Mateusz please schedule a virtual or in person follow-up visit  ----- Message -----  From: Anoop Clark, PT, DPT  Sent: 7/30/2020  11:43 AM EDT  To: Ernestine Cartwright MD  Subject: Physical Therapy progress                        Hi Dr. Vadim Dewey,       My name is Kathrin Robb, the PT that has been working with your patient, Zabrina French. We have been working for 6 sessions thus far on her lumbar radiculopathy. Unfortunately, we have only had minimal success, and her symptoms only have mildly changed since beginning care. I encouraged her to get back in to see you since he have had little luck thus far. I still have her for a few more sessions, but I wanted to hold off making anything else until you see her again. Thank you, and please let me know if there's anything I can do to help!     Domenica Lindsay

## 2020-08-06 ENCOUNTER — HOSPITAL ENCOUNTER (OUTPATIENT)
Dept: PHYSICAL THERAPY | Age: 65
Discharge: HOME | End: 2020-08-06
Payer: MEDICARE

## 2020-08-06 PROCEDURE — 97140 MANUAL THERAPY 1/> REGIONS: CPT | Performed by: PHYSICAL THERAPIST

## 2020-08-06 NOTE — PROGRESS NOTES
PT DAILY TREATMENT NOTE - [facility] 2-15    Patient Name: Yan Young  Date:2020  : 1955  [x]  Patient  Verified  Payor: [place] MEDICARE / Plan: [place] MEDICARE PART A & B / Product Type: Medicare /    In time:945a  Out time:1025a  Total Treatment Time (min): 45  Total Timed Codes (min): 30  1:1 Treatment Time ([facility] only): 30   Visit #: 7    Treatment Area: Right sided sciatica [M54.31]    SUBJECTIVE  Pain Level (0-10 scale): 7  Any medication changes, allergies to medications, adverse drug reactions, diagnosis change, or new procedure performed?: [x] No    [] Yes (see summary sheet for update)  Subjective functional status/changes:   [] No changes reported  Doing worse. Not sleeping well and has been doing the NSAIDs and heating pad constantly now. Feeling very frustrated. Isn't able to see the Neuro until 9/10, and declines to be seen by an NP any sooner. OBJECTIVE           Modality rationale: decrease pain and increase tissue extensibility to improve the patients ability to move without pain     Min Type Additional Details        []????????????? Estim: []??????????? ?? Att   []????????????? Unatt    []?????????????TENS instruct                  []?????????????IFC  []????????????? Premod   []?????????????NMES                     []????????????? Other:  []?????????????w/US   []?????????????w/ice   []?????????????w/heat  Position:  Location:        []?????????????  Traction: []????????????? Cervical       []?????????????Lumbar                       []????????????? Prone          []????????????? Supine                       []??????????? ? ? Intermittent   []????????????? Continuous Lbs:   []????????????? before manual  []????????????? after manual  []?????????????w/heat     []?????????????  Ultrasound: []????????????? Continuous   []????????????? Pulsed                       at: []?????????????1MHz   []?????????????3MHz Location:  W/cm2:     []????????????? Paraffin     Location:   []?????????????w/heat   10 []?????????????  Ice     [x]?????????????  Heat  []?????????????  Ice massage Position: prone  Location: lumbar/hip     []?????????????  Laser  []?????????????  Other: Position:  Location:        []?????????????  Vasopneumatic Device Pressure:       []????????????? lo []????????????? med []????????????? hi   Temperature:       [x]????????????? Skin assessment post-treatment:  [x]?????????????intact []?????????????redness- no adverse reaction    []?????????????redness  adverse reaction:      -- min Therapeutic Exercise:  [x]? ???? See flow sheet :   Rationale: increase ROM and increase strength to improve the patients ability to walk without pain     30 min Manual Therapy:  sidelying lumbar rotation stretching, sidelying lumbar gapping stretch, prone sacral float, GII-III lumbar P-A mobs L4-5, STM/MFR to paraspinals in sidelying. Low level sciatic nerve glides     HELD TODAY:   long limb distraction   Rationale: decrease pain, increase ROM, increase tissue extensibility and decrease trigger points to improve the patients ability to walk without pain                                                                 With   []????? TE   []????? TA   []????? neuro   []????? other: Patient Education: [x]????? Review HEP    []????? Progressed/Changed HEP based on:   []????? positioning   []????? body mechanics   []????? transfers   []????? heat/ice application    []????? other:       Other Objective/Functional Measures: --        Pain Level (0-10 scale) post treatment: 6     ASSESSMENT/Changes in Function:   Overall worsening, and is definitely being impacted by her lack of sleep. 1 more PT session, but unfortunately due to lack of progress, will likely hold further PT thereafter.    Patient will continue to benefit from skilled PT services to modify and progress therapeutic interventions, address functional mobility deficits, address ROM deficits, address strength deficits, analyze and address soft tissue restrictions and analyze and cue movement patterns to attain remaining goals.     []?????  See Plan of Care  []?????  See progress note/recertification  []?????  See Discharge Summary         Progress towards goals / Updated goals:  Long Term Goals: To be accomplished in 10-12 treatments:              1.  Pt will be able to perform light yardwork without increase in pain              2. Pt will be able to sit for at least 30 minutes without increase in pain              3. Pt will be able to stand at least 30 minutes without increase in pain NOT MET              4. Pt will be able to self-manage care using updated HEP for improved independence     PLAN  [x]?????  Upgrade activities as tolerated     [x]?????  Continue plan of care  []?????  Update interventions per flow sheet       []?????  Discharge due to:_  []?????  Other:_        Prudjulianna Pantoja PT, DPT 8/6/2020

## 2020-08-13 ENCOUNTER — HOSPITAL ENCOUNTER (OUTPATIENT)
Dept: PHYSICAL THERAPY | Age: 65
Discharge: HOME | End: 2020-08-13
Payer: MEDICARE

## 2020-08-13 PROCEDURE — 97140 MANUAL THERAPY 1/> REGIONS: CPT | Performed by: PHYSICAL THERAPIST

## 2020-08-13 PROCEDURE — 97110 THERAPEUTIC EXERCISES: CPT | Performed by: PHYSICAL THERAPIST

## 2020-08-13 NOTE — PROGRESS NOTES
[facility]   [address] [address]  Phone: [phone] Fax: [phone]    Progress Note    Name: Kirk Eden   : 1955   MD: Gaviota Pereira MD       Treatment Diagnosis: Right sided sciatica [M54.31]  Start of Care: 2020    Visits from Start of Care: 8  Missed Visits: 0    Summary of Care: Ms. Lee Garza has been seen for 8 sessions regarding her R sided lumbar radiculopathy. We have made minimal progress during that time, and symptoms remain relatively unchanged. Recommended she f/u with Dr. Caraballo Even for further evaluation as PT intervention has proved unsuccessful at this time. Will place PT on hold pending MD follow up. Assessment / Recommendations:     Long Term Goals: To be accomplished in 10-12 treatments:              1. Pt will be able to perform light yardwork without increase in pain NOT MET              2. Pt will be able to sit for at least 30 minutes without increase in pain NOT MET              3. Pt will be able to stand at least 30 minutes without increase in pain NOT MET              4. Pt will be able to self-manage care using updated HEP for improved independence PROGRESSING    Hold therapy and await physician's recommendations. Please advise. Yareli Prince, PT, DPT 2020     ________________________________________________________________________  NOTE TO PHYSICIAN:  Please complete the following and fax to: [facility]: Fax: [phone]. Dillon Lozano Retain this original for your records. If you are unable to process this request in 24 hours, please contact our office.        ____ I have read the above report and request that my patient continue therapy with the following changes/special instructions:  ____ I have read the above report and request that my patient be discharged from therapy    Physician's Signature:_________________ Date:___________Time:__________

## 2020-08-13 NOTE — PROGRESS NOTES
PT DAILY TREATMENT NOTE - [facility] 2-15    Patient Name: Rudolph Sarah  Date:2020  : 1955  [x]  Patient  Verified  Payor: Yue Caraballo / Plan: [place] & [place] / Product Type: Medicare /    In time: 278Y  Out time:1025a  Total Treatment Time (min): 50  Total Timed Codes (min): 40  1:1 Treatment Time (MC only): 40   Visit #: 8    Treatment Area: Right sided sciatica [M54.31]    SUBJECTIVE  Pain Level (0-10 scale): 5  Any medication changes, allergies to medications, adverse drug reactions, diagnosis change, or new procedure performed?: [x] No    [] Yes (see summary sheet for update)  Subjective functional status/changes:   [] No changes reported  Pain is a little better controlled this week because she hasn't been doing anything. Still limited improvement overall. OBJECTIVE    Modality rationale: decrease pain and increase tissue extensibility to improve the patients ability to move without pain     Min Type Additional Details        []?????????????? Estim: []???????????? ?? Att   []?????????????? Unatt    []??????????????TENS instruct                  []??????????????IFC  []?????????????? Premod   []??????????????NMES                     []?????????????? Other:  []??????????????w/US   []??????????????w/ice   []??????????????w/heat  Position:  Location:        []??????????????  Traction: []?????????????? Cervical       []??????????????Lumbar                       []?????????????? Prone          []?????????????? Supine                       []???????????? ? ? Intermittent   []?????????????? Continuous Lbs:   []?????????????? before manual  []?????????????? after manual  []??????????????w/heat     []??????????????  Ultrasound: []?????????????? Continuous   []?????????????? Pulsed                       at: []??????????????1MHz   []??????????????3MHz Location:  W/cm2:     []?????????????? Paraffin     Location:   []??????????????w/heat   10 []??????????????  Ice     [x]??????????????  Heat  []??????????????  Ice massage Position: prone  Location: lumbar/hip     []??????????????  Laser  []??????????????  Other: Position:  Location:        []??????????????  Vasopneumatic Device Pressure:       []?????????????? lo []?????????????? med []?????????????? hi   Temperature:       [x]?????????????? Skin assessment post-treatment:  [x]??????????????intact []??????????????redness- no adverse reaction    []??????????????redness  adverse reaction:      25 min Therapeutic Exercise:  [x]?????? See flow sheet :   Rationale: increase ROM and increase strength to improve the patients ability to walk without pain     15 min Manual Therapy:  sidelying lumbar rotation stretching, sidelying lumbar gapping stretch, prone sacral float, GII-III lumbar P-A mobs L4-5, STM/MFR to paraspinals in sidelying.  Low level sciatic nerve glides     HELD TODAY:   long limb distraction   Rationale: decrease pain, increase ROM, increase tissue extensibility and decrease trigger points to improve the patients ability to walk without pain                                                                 With   []?????? TE   []?????? TA   []?????? neuro   []?????? other: Patient Education: [x]?????? Review HEP    []?????? Progressed/Changed HEP based on:   []?????? positioning   []?????? body mechanics   []?????? transfers   []?????? heat/ice application    []?????? other:       Other Objective/Functional Measures: --        Pain Level (0-10 scale) post treatment: 4     ASSESSMENT/Changes in Function:   []??????  See Plan of Care  [x]??????  See progress note/recertification  []??????  See Discharge Summary    PLAN  []??????  Upgrade activities as tolerated     []??????  Continue plan of care  []??????  Update interventions per flow sheet       []??????  Discharge due to:_  [x]??????  Other: PT on hold      Mateus Davila PT, DPT 8/13/2020

## 2020-09-10 ENCOUNTER — OFFICE VISIT (OUTPATIENT)
Dept: NEUROLOGY | Facility: CLINIC | Age: 65
End: 2020-09-10
Payer: MEDICARE

## 2020-09-10 VITALS
OXYGEN SATURATION: 98 % | DIASTOLIC BLOOD PRESSURE: 78 MMHG | RESPIRATION RATE: 16 BRPM | HEART RATE: 59 BPM | HEIGHT: 63 IN | WEIGHT: 165 LBS | SYSTOLIC BLOOD PRESSURE: 118 MMHG | BODY MASS INDEX: 29.23 KG/M2

## 2020-09-10 DIAGNOSIS — M54.31 SCIATICA, RIGHT SIDE: Primary | ICD-10-CM

## 2020-09-10 DIAGNOSIS — M51.26 LUMBAR HERNIATED DISC: ICD-10-CM

## 2020-09-10 PROCEDURE — G8427 DOCREV CUR MEDS BY ELIG CLIN: HCPCS | Performed by: PSYCHIATRY & NEUROLOGY

## 2020-09-10 PROCEDURE — 1090F PRES/ABSN URINE INCON ASSESS: CPT | Performed by: PSYCHIATRY & NEUROLOGY

## 2020-09-10 PROCEDURE — G8510 SCR DEP NEG, NO PLAN REQD: HCPCS | Performed by: PSYCHIATRY & NEUROLOGY

## 2020-09-10 PROCEDURE — 1101F PT FALLS ASSESS-DOCD LE1/YR: CPT | Performed by: PSYCHIATRY & NEUROLOGY

## 2020-09-10 PROCEDURE — 3017F COLORECTAL CA SCREEN DOC REV: CPT | Performed by: PSYCHIATRY & NEUROLOGY

## 2020-09-10 PROCEDURE — G8419 CALC BMI OUT NRM PARAM NOF/U: HCPCS | Performed by: PSYCHIATRY & NEUROLOGY

## 2020-09-10 PROCEDURE — G8536 NO DOC ELDER MAL SCRN: HCPCS | Performed by: PSYCHIATRY & NEUROLOGY

## 2020-09-10 PROCEDURE — 99214 OFFICE O/P EST MOD 30 MIN: CPT | Performed by: PSYCHIATRY & NEUROLOGY

## 2020-09-10 PROCEDURE — G8400 PT W/DXA NO RESULTS DOC: HCPCS | Performed by: PSYCHIATRY & NEUROLOGY

## 2020-09-10 NOTE — PROGRESS NOTES
Ms. Sharif Morocho presents today to follow up sciatica of right side. Depression screening done on patient.

## 2020-09-10 NOTE — PATIENT INSTRUCTIONS
PRESCRIPTION REFILL POLICY [facility] Statement to Patients April 1, 2014 In an effort to ensure the large volume of patient prescription refills is processed in the most efficient and expeditious manner, we are asking our patients to assist us by calling your Pharmacy for all prescription refills, this will include also your  Mail Order Pharmacy. The pharmacy will contact our office electronically to continue the refill process. Please do not wait until the last minute to call your pharmacy. We need at least 48 hours (2days) to fill prescriptions. We also encourage you to call your pharmacy before going to  your prescription to make sure it is ready. With regard to controlled substance prescription refill requests (narcotic refills) that need to be picked up at our office, we ask your cooperation by providing us with at least 72 hours (3days) notice that you will need a refill. We will not refill narcotic prescription refill requests after 4:00pm on any weekday, Monday through Thursday, or after 2:00pm on Fridays, or on the weekends. We encourage everyone to explore another way of getting your prescription refill request processed using YuMe, our patient web portal through our electronic medical record system. YuMe is an efficient and effective way to communicate your medication request directly to the office and  downloadable as an marti on your smart phone . YuMe also features a review functionality that allows you to view your medication list as well as leave messages for your physician. Are you ready to get connected? If so please review the attatched instructions or speak to any of our staff to get you set up right away! Thank you so much for your cooperation. Should you have any questions please contact our Practice Administrator. The Physicians and Staff,  [facility]

## 2020-09-10 NOTE — PROGRESS NOTES
Chief Complaint   Patient presents with    Neurologic Problem         HISTORY OF PRESENT ILLNESS  Uma Vang came back for follow-up. She is at physical therapy did not help. She continues to have difficulties with daily activities in the household. Her back is constantly hurting. Sometimes her right leg will start to give out. Uses NSAIDs as needed but they tear her stomach up, she says. RECAP  She came in for neurological evaluation regarding low back pain that is radiating down into the right lower extremity. She started noticing it about 6 weeks ago as she was mowing her grass. Initially it was localized to the hip region but then it progressed on and then she developed back pain. Bending over aggravates the pain. She has been walking somewhat slower and at times tends to drag her right leg because of the discomfort. Denies any numbness in the leg. She had x-ray and was given a course of Medrol which helped for a few days but then the pain came back. She has also been doing home exercises for the past 4 weeks but she finds them quite painful and have not helped. Current Outpatient Medications   Medication Sig    methylPREDNISolone (MEDROL DOSEPACK) 4 mg tablet Take as directed    diclofenac EC (VOLTAREN) 50 mg EC tablet Take 1 Tab by mouth two (2) times daily as needed for Pain.  montelukast (SINGULAIR) 10 mg tablet     ALPRAZolam (XANAX) 0.5 mg tablet     levothyroxine (LEVOXYL) 50 mcg tablet Take 50 mcg by mouth Daily (before breakfast).  traZODone (DESYREL) 100 mg tablet Take 100 mg by mouth nightly.  fluticasone (FLONASE) 50 mcg/actuation nasal spray 2 Sprays by Both Nostrils route daily.  b complex vitamins tablet Take 1 Tab by mouth daily.  fexofenadine (ALLEGRA) 180 mg tablet Take 180 mg by mouth daily. No current facility-administered medications for this visit.         PHYSICAL EXAMINATION:    Visit Vitals  /78   Pulse (!) 59   Resp 16   Ht 5' 3\" (1.6 m)   Wt 74.8 kg (165 lb)   SpO2 98%   BMI 29.23 kg/m²       NEUROLOGICAL EXAMINATION:     Mental Status:   Alert and oriented to person, place, and time with recent and remote memory intact. Attention span and concentration are normal. Speech is fluent. Cranial Nerves:    II, III, IV, VI:  Visual acuity grossly intact. Visual fields are normal.    Pupils are equal, round, and reactive to light and accommodation. Extra-ocular movements are full and fluid. Fundoscopic exam was benign, no ptosis or nystagmus. V-XII: Hearing is grossly intact. Facial features are symmetric, with normal sensation and strength. The palate rises symmetrically and the tongue protrudes midline. Sternocleidomastoids 5/5. Motor Examination: Normal tone, bulk, and strength. 5/5 muscle strength throughout. Right big toe extensors were weaker compared to the left. No cogwheel rigidity or clonus present. Sensory exam:  Normal throughout to pinprick, temperature, and vibration sense. Normal proprioception. Coordination:  Finger to nose and rapid arm movement testing was normal.   No resting or intention tremor    Gait and Station:  Steady but slow. She was able to walk on her toes but not on her heels. Normal arm swing. No Rhomberg or pronator drift. No muscle wasting or fasiculations noted. Reflexes:  DTRs 2+ throughout. Toes downgoing. Slight leg raise test was positive on the right      LABS / IMAGING  MRI Results (most recent):  Results from Hospital Encounter encounter on 06/19/20   MRI LUMB SPINE WO CONT    Narrative EXAM: MRI LUMB SPINE WO CONT    INDICATION: Lower back and right hip pain. Symptoms for 8 weeks. No trauma. Sciatica, right side      Exam: MRI of the lumbar spine. Sequences include sagittal and axial T1 and  T2-weighted images. Sagittal STIR. Comparisons: None    Contrast: None.     Findings: Alignment is normal. There is no evidence of marrow replacement or  acute fracture. Cord terminus is within normal limits. 6 mm nodule left adrenal  gland too small to characterize. .    T12-L1: Mild desiccation of this disc with broad-based central protrusion  without stenosis. L1-L2: Mild desiccation of this disc without stenosis    L2-L3: There is desiccation of this disc. There is a broad-based central  extrusion extending caudally with minimal facet degenerative change. This  slightly indents the ventral thecal sac without significant foraminal narrowing    L3-L4: No stenosis    L4-L5: No stenosis    L5-S1: No stenosis      Impression Impression:  1. Transitional anatomy at the lumbosacral junction. The transitional vertebral  body will be labeled L5 for the purposes of the study  2. Multilevel degenerative change detailed by level above most significant at  L4-L5 with right paracentral disc protrusion resulting in severe narrowing of  the right subarticular zone and mild to moderate narrowing of the canal  3. Left foraminal protrusion L3-L4           MRI images were independently reviewed    ASSESSMENT    ICD-10-CM ICD-9-CM    1. Sciatica, right side  M54.31 724.3 REFERRAL TO NEUROSURGERY   2. Lumbar herniated disc  M51.26 722.10 REFERRAL TO NEUROSURGERY       DISCUSSION  Ms. Connie Ivory has symptoms of sciatica on the right side with mild weakness of the big toe extensors likely due to radiculopathy related to disc herniation at L4-L5 level  Since she has tried physical therapy and the amount of disc material that seems to be extruded out of its place, I have recommended a surgical opinion    Oscar Malagon MD  Diplomate, American Board of Psychiatry & Neurology (Neurology)  Diplomate, American Board of Psychiatry & Neurology (Clinical Neurophysiology)  Diplomate, American Board of Electrodiagnostic Medicine    This note will not be viewable in [address] Came back for follow-up. She has tried physical therapy but it did not help.

## 2020-09-11 ENCOUNTER — DOCUMENTATION ONLY (OUTPATIENT)
Dept: NEUROLOGY | Facility: CLINIC | Age: 65
End: 2020-09-11

## 2020-10-05 NOTE — ANCILLARY DISCHARGE INSTRUCTIONS
[facility] [place] [facility], [address] [place] Phone: [phone] Fax: [phone] Discharge Summary 2-15 Patient name: Kirt Hamilton  : 1955  Provider#: [id number] Referral source: Irene So MD     
Medical/Treatment Diagnosis: Right sided sciatica [M54.31] Prior Hospitalization: see medical history Comorbidities: kidney stones, Guillain-Lottie Prior Level of Function: able to perform yardwork without issue Medications: Verified on Patient Summary List 
 
Start of Care: 2020                                                                   Onset Date: May 2020 Visits from Start of Care: 8     Missed Visits: 0 Reporting Period : 2020 to 2020 Assessment/Summary of Care: Ms. Adams was seen for 8 sessions regarding her R sided lumbar radiculopathy. We made minimal progress during that time, and symptoms remained relatively unchanged. Recommended she f/u with Dr. Koko Urena for further evaluation as PT intervention proved unsuccessful. Will proceed with d/c at this time.   
 
  
Long Term Goals: To be accomplished in 10-12 treatments: 
            1. Pt will be able to perform light yardwork without increase in pain NOT MET 
            2. Pt will be able to sit for at least 30 minutes without increase in pain NOT MET 
            3. Pt will be able to stand at least 30 minutes without increase in pain NOT MET 
            4. Pt will be able to self-manage care using updated HEP for improved independence PROGRESSING 
 
 
 
RECOMMENDATIONS: 
[]Discontinue therapy: []Patient has reached or is progressing toward set goals []Patient is non-compliant or has abdicated 
   []Due to lack of appreciable progress towards set goals Emperatriz Guevara, PT, DPT 10/5/2020

## 2021-07-12 ENCOUNTER — TELEPHONE (OUTPATIENT)
Dept: NEUROLOGY | Age: 66
End: 2021-07-12

## 2021-07-12 NOTE — TELEPHONE ENCOUNTER
Per Dr. Kam Brownlee, Only options I can suggest is to be on a wait list if a cancellation comes up or add it to schedule during the lunch hour

## 2021-07-12 NOTE — TELEPHONE ENCOUNTER
Patient is having a lot of pain in her back and neck. The pain seems to be positional and is increasingly worsening. She is scheduled to see Alexis Palumbo on 7/23 but would prefer to see Dr. Beryle Char. She is driving up from [facility] for the appt. Please advise. She was previously seen for a herniated disc.

## 2021-07-12 NOTE — TELEPHONE ENCOUNTER
Dr. Yoko Alegria, patient does not think she should see an NP for a new problem. 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 I shouldn't have to wait so long to see Dr. Courtney Leahy". I advised that we do not have any availability soon. Is there somewhere you want to work this patient in?  Thanks

## 2021-07-16 ENCOUNTER — OFFICE VISIT (OUTPATIENT)
Dept: NEUROLOGY | Age: 66
End: 2021-07-16
Payer: MEDICARE

## 2021-07-16 VITALS — WEIGHT: 165 LBS | HEIGHT: 63 IN | BODY MASS INDEX: 29.23 KG/M2

## 2021-07-16 DIAGNOSIS — M79.602 LEFT ARM PAIN: ICD-10-CM

## 2021-07-16 DIAGNOSIS — M54.12 CERVICAL RADICULOPATHY: ICD-10-CM

## 2021-07-16 DIAGNOSIS — M54.31 SCIATICA, RIGHT SIDE: Primary | ICD-10-CM

## 2021-07-16 PROCEDURE — G8510 SCR DEP NEG, NO PLAN REQD: HCPCS | Performed by: PSYCHIATRY & NEUROLOGY

## 2021-07-16 PROCEDURE — 1101F PT FALLS ASSESS-DOCD LE1/YR: CPT | Performed by: PSYCHIATRY & NEUROLOGY

## 2021-07-16 PROCEDURE — 1090F PRES/ABSN URINE INCON ASSESS: CPT | Performed by: PSYCHIATRY & NEUROLOGY

## 2021-07-16 PROCEDURE — 3017F COLORECTAL CA SCREEN DOC REV: CPT | Performed by: PSYCHIATRY & NEUROLOGY

## 2021-07-16 PROCEDURE — G8400 PT W/DXA NO RESULTS DOC: HCPCS | Performed by: PSYCHIATRY & NEUROLOGY

## 2021-07-16 PROCEDURE — G8427 DOCREV CUR MEDS BY ELIG CLIN: HCPCS | Performed by: PSYCHIATRY & NEUROLOGY

## 2021-07-16 PROCEDURE — G8536 NO DOC ELDER MAL SCRN: HCPCS | Performed by: PSYCHIATRY & NEUROLOGY

## 2021-07-16 PROCEDURE — 99214 OFFICE O/P EST MOD 30 MIN: CPT | Performed by: PSYCHIATRY & NEUROLOGY

## 2021-07-16 PROCEDURE — G8419 CALC BMI OUT NRM PARAM NOF/U: HCPCS | Performed by: PSYCHIATRY & NEUROLOGY

## 2021-07-16 RX ORDER — ATORVASTATIN CALCIUM 40 MG/1
TABLET, FILM COATED ORAL DAILY
COMMUNITY

## 2021-07-16 NOTE — PROGRESS NOTES
Chief Complaint   Patient presents with    Neurologic Problem         HISTORY OF PRESENT ILLNESS  Yoni Christopher came back for follow-up. She was last seen for sciatica symptoms in her right leg. Saw neurosurgery, Dr. Ayaka Pena and this was treated conservatively due to spinal instability, with epidural injections and therapy. Those helped and she has now learned to do things in a certain way that avoid strain on her back. She has also been experiencing neck pain intermittently for the past year but over the past month or so it has become quite intense and almost daily it radiates down into her left arm. She starts to experience tingling and burning sensation in her entire left arm and strength appears to be decreasing. Looking up or turning her head to the left exacerbate her symptoms      Current Outpatient Medications   Medication Sig    atorvastatin (Lipitor) 40 mg tablet Take  by mouth daily.  diclofenac EC (VOLTAREN) 50 mg EC tablet Take 1 Tab by mouth two (2) times daily as needed for Pain.  montelukast (SINGULAIR) 10 mg tablet     levothyroxine (LEVOXYL) 50 mcg tablet Take 50 mcg by mouth Daily (before breakfast).  traZODone (DESYREL) 100 mg tablet Take 100 mg by mouth nightly.  fluticasone (FLONASE) 50 mcg/actuation nasal spray 2 Sprays by Both Nostrils route daily.  b complex vitamins tablet Take 1 Tab by mouth daily.  methylPREDNISolone (MEDROL DOSEPACK) 4 mg tablet Take as directed (Patient not taking: Reported on 7/16/2021)    ALPRAZolam (XANAX) 0.5 mg tablet  (Patient not taking: Reported on 7/16/2021)    fexofenadine (ALLEGRA) 180 mg tablet Take 180 mg by mouth daily. (Patient not taking: Reported on 7/16/2021)     No current facility-administered medications for this visit.        PHYSICAL EXAMINATION:    Visit Vitals  Ht 5' 3\" (1.6 m)   Wt 165 lb (74.8 kg)   BMI 29.23 kg/m²       NEUROLOGICAL EXAMINATION:     Mental Status:   Alert and oriented to person, place, and time.  Attention span and concentration are normal. Speech is fluent . Cranial Nerves:    II, III, IV, VI:  Visual acuity grossly intact. Visual fields are normal.    Pupils are equal, round, and reactive. Extra-ocular movements are full and fluid. V-XII: Hearing is grossly intact. Facial features are symmetric, with normal sensation and strength. The palate rises symmetrically and the tongue protrudes midline. Motor Examination: Normal tone, bulk except for left triceps which is showing trace weakness when compared to the right, and strength. Sensory exam:  Normal throughout     Coordination:  Finger to nose and rapid arm movement testing was normal.   No resting or intention tremor    Gait and Station:  Steady. Normal arm swing. No muscle wasting or fasiculations noted. DTRs hypoactive all over      LABS / IMAGING  MRI Results (most recent):  Results from [place] encounter on 06/19/20    MRI LUMB SPINE WO CONT    Narrative  EXAM: MRI LUMB SPINE WO CONT    INDICATION: Lower back and right hip pain. Symptoms for 8 weeks. No trauma. Sciatica, right side      Exam: MRI of the lumbar spine. Sequences include sagittal and axial T1 and  T2-weighted images. Sagittal STIR. Comparisons: None    Contrast: None. Findings: Alignment is normal. There is no evidence of marrow replacement or  acute fracture. Cord terminus is within normal limits. 6 mm nodule left adrenal  gland too small to characterize. .    T12-L1: Mild desiccation of this disc with broad-based central protrusion  without stenosis. L1-L2: Mild desiccation of this disc without stenosis    L2-L3: There is desiccation of this disc. There is a broad-based central  extrusion extending caudally with minimal facet degenerative change. This  slightly indents the ventral thecal sac without significant foraminal narrowing    L3-L4: No stenosis    L4-L5: No stenosis    L5-S1: No stenosis    Impression  Impression:  1. Transitional anatomy at the lumbosacral junction. The transitional vertebral  body will be labeled L5 for the purposes of the study  2. Multilevel degenerative change detailed by level above most significant at  L4-L5 with right paracentral disc protrusion resulting in severe narrowing of  the right subarticular zone and mild to moderate narrowing of the canal  3. Left foraminal protrusion L3-L4      ASSESSMENT    ICD-10-CM ICD-9-CM    1. Sciatica, right side  M54.31 724.3 MRI CERV SPINE WO CONT      EMG NCV MOTOR WO F/WAVE PER NERVE   2. Cervical radiculopathy  M54.12 723.4 MRI CERV SPINE WO CONT      EMG NCV MOTOR WO F/WAVE PER NERVE   3. Left arm pain  M79.602 729.5 MRI CERV SPINE WO CONT      EMG NCV MOTOR WO F/WAVE PER NERVE       DISCUSSION  Ms. Cassia Ibarra has symptoms of sciatica on the right side which subsided with physical therapy and epidural injections. She came in today for neck pain that she has had for the past year and now has exacerbated, causes tingling and burning sensation in her left arm. Clinically she has trace weakness of the triceps on the left when compared to the right. I suspect that this is related to a cervical radiculopathy from degenerative changes .   Due to focal weakness, she needs MRI scan of the cervical spine prior to considering therapy and to see if she will need the surgical opinion  We will also check EMG/NCV    Jules Good MD  Diplomate, American Board of Psychiatry & Neurology (Neurology)  Diplomate, American Board of Psychiatry & Neurology (Clinical Neurophysiology)  Diplomate, American Board of Electrodiagnostic Medicine

## 2021-07-16 NOTE — PROGRESS NOTES
Ms. Vikash Adam presents today for evaluation of LUE numbness/burning. Depression screening done on patient.

## 2021-07-16 NOTE — PATIENT INSTRUCTIONS
[facility]   Statement to Patients  April 1, 2014      In an effort to ensure the large volume of patient prescription refills is processed in the most efficient and expeditious manner, we are asking our patients to assist us by calling your Pharmacy for all prescription refills, this will include also your  Mail Order Pharmacy. The pharmacy will contact our office electronically to continue the refill process. Please do not wait until the last minute to call your pharmacy. We need at least 48 hours (2days) to fill prescriptions. We also encourage you to call your pharmacy before going to  your prescription to make sure it is ready. With regard to controlled substance prescription refill requests (narcotic refills) that need to be picked up at our office, we ask your cooperation by providing us with at least 72 hours (3days) notice that you will need a refill. We will not refill narcotic prescription refill requests after 4:00pm on any weekday, Monday through Thursday, or after 2:00pm on Fridays, or on the weekends. We encourage everyone to explore another way of getting your prescription refill request processed using Tapit, our patient web portal through our electronic medical record system. Tapit is an efficient and effective way to communicate your medication request directly to the office and  downloadable as an marti on your smart phone . Tapit also features a review functionality that allows you to view your medication list as well as leave messages for your physician. Are you ready to get connected? If so please review the attatched instructions or speak to any of our staff to get you set up right away! Thank you so much for your cooperation. Should you have any questions please contact our Practice Administrator.     The Physicians and Staff,  [facility]

## 2021-07-20 ENCOUNTER — TELEPHONE (OUTPATIENT)
Dept: NEUROLOGY | Age: 66
End: 2021-07-20

## 2021-07-27 ENCOUNTER — HOSPITAL ENCOUNTER (OUTPATIENT)
Dept: MRI IMAGING | Age: 66
Discharge: HOME | End: 2021-07-27
Attending: PSYCHIATRY & NEUROLOGY
Payer: MEDICARE

## 2021-07-27 DIAGNOSIS — M54.31 SCIATICA, RIGHT SIDE: ICD-10-CM

## 2021-07-27 DIAGNOSIS — M54.12 CERVICAL RADICULOPATHY: ICD-10-CM

## 2021-07-27 DIAGNOSIS — M79.602 LEFT ARM PAIN: ICD-10-CM

## 2021-07-27 PROCEDURE — 72141 MRI NECK SPINE W/O DYE: CPT

## 2021-07-28 ENCOUNTER — TELEPHONE (OUTPATIENT)
Dept: NEUROLOGY | Age: 66
End: 2021-07-28

## 2021-07-28 DIAGNOSIS — M71.30 SYNOVIAL CYST: ICD-10-CM

## 2021-07-28 DIAGNOSIS — M50.90 CERVICAL DISC DISEASE: Primary | ICD-10-CM

## 2021-07-28 NOTE — PROGRESS NOTES
I have reviewed this information with patient. She expressed understanding a nd agreed to this plan. Faxed referral and MRI to Dr. Alexsander Griggs.

## 2021-07-28 NOTE — PROGRESS NOTES
I left a voicemail for patient regarding MRI. Please inform that she had some nerve root compression at C5-6 related to a disc and possibly a cyst.  She needs to get an opinion from neurosurgery and I have put in a referral to see Dr. Yesy Gustafson.

## 2021-07-28 NOTE — TELEPHONE ENCOUNTER
----- Message from Eliezer Levine sent at 7/28/2021  2:07 PM EDT -----  Regarding: Dr. Kim Crane  Patient return call    Caller's first and last name and relationship (if not the patient):   PT      Best contact number(s):  [phone]      Whose call is being returned: Dr. Jessica Biggs call received today to discuss results of pt's MRI of Neck      Details to clarify the request:      Eliezer Levine

## 2021-08-02 ENCOUNTER — OFFICE VISIT (OUTPATIENT)
Dept: NEUROLOGY | Age: 66
End: 2021-08-02
Payer: MEDICARE

## 2021-08-02 DIAGNOSIS — R20.0 NUMBNESS AND TINGLING IN LEFT ARM: ICD-10-CM

## 2021-08-02 DIAGNOSIS — R20.2 NUMBNESS AND TINGLING IN LEFT ARM: ICD-10-CM

## 2021-08-02 PROCEDURE — 95909 NRV CNDJ TST 5-6 STUDIES: CPT | Performed by: PSYCHIATRY & NEUROLOGY

## 2021-08-02 PROCEDURE — 95886 MUSC TEST DONE W/N TEST COMP: CPT | Performed by: PSYCHIATRY & NEUROLOGY

## 2021-08-02 NOTE — PROGRESS NOTES
[address] [facility]  [address] [facility], [address] [facility]  Phone [phone] Fax [phone]  Test Date:  2021    Patient: Ada Duvall : 1955 Physician: Nico Walter MD   Sex: Female Height: 5' 3\" Ref Phys: Nico Walter md   ID#: [id number] Weight: 165 lbs. Technician: Jens Carrion     Patient Complaints:  LUE    Patient History / Exam:  19-year-old female who is being evaluated for neck pain and tingling/burning sensation in the left arm    NCV & EMG Findings:  All nerve conduction studies were within normal limits. All F Wave latencies were within normal limits. Needle evaluation of the left pronator teres muscle showed increased motor unit amplitude and slightly increased polyphasic potentials. All remaining muscles (as indicated in the following table) showed no evidence of electrical instability. Impression:    The electrodiagnostic testing is unremarkable except for mild chronic neurogenic changes in the C6 myotome on the left, which may be related to an underlying radiculopathy at this level. No acute denervation was seen and there is no evidence of entrapment mononeuropathy.     Recommendations:  Please correlate with imaging i.e. MRI of the cervical spine    ___________________________  Nico Walter MD        Nerve Conduction Studies  Anti Sensory Summary Table     Stim Site NR Peak (ms) Norm Peak (ms) P-T Amp (µV) Norm P-T Amp Onset (ms) Site1 Site2 Delta-P (ms) Dist (cm) Sincere (m/s) Norm Sincere (m/s)   Left Median Anti Sensory (2nd Digit)  32.3°C   Wrist    3.0 <3.6 18.0 >10 2.3 Wrist 2nd Digit 3.0 14.0 47 >39   Elbow    2.9  21.4  2.2 Elbow Wrist 0.1 0.0  >48   Left Radial Anti Sensory (Base 1st Digit)  31.5°C   Wrist    2.1 <3.1 40.8  1.5 Wrist Base 1st Digit 2.1 0.0     Site 2    2.1  41.5  1.6         Left Ulnar Anti Sensory (5th Digit)  31.7°C   Wrist    3.1 <3.7 19.9 >15.0 2.4 Wrist 5th Digit 3.1 14.0 45 >38   B Elbow    3.1  19.1  2.6 B Elbow Wrist 0.0 0.0  >47     Motor Summary Table     Stim Site NR Onset (ms) Norm Onset (ms) O-P Amp (mV) Norm O-P Amp Site1 Site2 Delta-0 (ms) Dist (cm) Sincere (m/s) Norm Sincere (m/s)   Left Median Motor (Abd Poll Brev)  31.3°C   Wrist    3.7 <4.2 8.8 >5 Elbow Wrist 2.9 19.0 66 >50   Elbow    6.6  4.4          Left Ulnar Motor (Abd Dig Minimi)  32.5°C   Wrist    3.2 <4.2 9.9 >3 B Elbow Wrist 2.6 17.0 65 >53   B Elbow    5.8  7.1  A Elbow B Elbow 1.9 10.0 53 >53   A Elbow    7.7  8.5            F Wave Studies     NR F-Lat (ms) Lat Norm (ms) L-R F-Lat (ms) L-R Lat Norm   Left Ulnar (Mrkrs) (Abd Dig Min)  32.8°C      24.42 <36  <2.5     EMG     Side Muscle Nerve Root Ins Act Fibs Psw Amp Dur Poly Recrt Int Pat Comment   Left 1stDorInt Ulnar C8-T1 Nml Nml Nml Nml Nml 0 Nml Nml    Left BrachioRad Radial C5-6 Nml Nml Nml Nml Nml 0 Nml Nml    Left PronatorTeres Median C6-7 Nml Nml Nml Incr Nml 1+ Nml Nml    Left Biceps Musculocut C5-6 Nml Nml Nml Nml Nml 0 Nml Nml    Left Triceps Radial C6-7-8 Nml Nml Nml Nml Nml 0 Nml Nml    Left Deltoid Axillary C5-6 Nml Nml Nml Nml Nml 0 Nml Nml    Left Cervical Parasp Mid Rami C4-6 Nml Nml Nml               Waveforms:

## 2022-01-10 ENCOUNTER — OFFICE VISIT (OUTPATIENT)
Dept: NEUROLOGY | Age: 67
End: 2022-01-10
Payer: MEDICARE

## 2022-01-10 VITALS
OXYGEN SATURATION: 97 % | WEIGHT: 162 LBS | SYSTOLIC BLOOD PRESSURE: 125 MMHG | BODY MASS INDEX: 28.7 KG/M2 | DIASTOLIC BLOOD PRESSURE: 84 MMHG | HEART RATE: 58 BPM | HEIGHT: 63 IN | TEMPERATURE: 97.6 F

## 2022-01-10 DIAGNOSIS — M54.12 CERVICAL RADICULOPATHY: Primary | ICD-10-CM

## 2022-01-10 PROCEDURE — 3017F COLORECTAL CA SCREEN DOC REV: CPT | Performed by: PSYCHIATRY & NEUROLOGY

## 2022-01-10 PROCEDURE — G8419 CALC BMI OUT NRM PARAM NOF/U: HCPCS | Performed by: PSYCHIATRY & NEUROLOGY

## 2022-01-10 PROCEDURE — G8536 NO DOC ELDER MAL SCRN: HCPCS | Performed by: PSYCHIATRY & NEUROLOGY

## 2022-01-10 PROCEDURE — 99214 OFFICE O/P EST MOD 30 MIN: CPT | Performed by: PSYCHIATRY & NEUROLOGY

## 2022-01-10 PROCEDURE — G8510 SCR DEP NEG, NO PLAN REQD: HCPCS | Performed by: PSYCHIATRY & NEUROLOGY

## 2022-01-10 PROCEDURE — 1101F PT FALLS ASSESS-DOCD LE1/YR: CPT | Performed by: PSYCHIATRY & NEUROLOGY

## 2022-01-10 PROCEDURE — G8400 PT W/DXA NO RESULTS DOC: HCPCS | Performed by: PSYCHIATRY & NEUROLOGY

## 2022-01-10 PROCEDURE — G8427 DOCREV CUR MEDS BY ELIG CLIN: HCPCS | Performed by: PSYCHIATRY & NEUROLOGY

## 2022-01-10 PROCEDURE — 1090F PRES/ABSN URINE INCON ASSESS: CPT | Performed by: PSYCHIATRY & NEUROLOGY

## 2022-01-10 RX ORDER — ACETAMINOPHEN 500 MG
2000 TABLET ORAL DAILY
COMMUNITY

## 2022-01-10 NOTE — PROGRESS NOTES
Chief Complaint   Patient presents with    Follow-up    Numbness    Tingling    Arm Pain         HISTORY OF PRESENT ILLNESS  Ofelia Falcon came back for follow-up. She has been followed in the past for sciatica type symptoms in her right leg which has been treated conservatively with epidural injections and physical therapy. She has seen neurosurgery. She also has neck pain and intermittent tingling sensation in her left arm related to certain postures. She was found to have a disc bulge at C5-C6 with herniation towards the right neural foramen and also suspected to have a possible cyst possibly originating from the intra-articular between C5-C6 and extending into the neuroforamen. She has seen Dr. Naveen Ferrell and also saw Dr. Kourtney Montero at [address] [place] [address] who both said that no surgical intervention is indicated and it would be high risk procedure      Current Outpatient Medications   Medication Sig    cholecalciferol (VITAMIN D3) (2,000 UNITS /50 MCG) cap capsule Take 2,000 Units by mouth daily.  atorvastatin (Lipitor) 40 mg tablet Take  by mouth daily.  montelukast (SINGULAIR) 10 mg tablet     levothyroxine (LEVOXYL) 50 mcg tablet Take 50 mcg by mouth Daily (before breakfast).  traZODone (DESYREL) 100 mg tablet Take 100 mg by mouth nightly.  fluticasone (FLONASE) 50 mcg/actuation nasal spray 2 Sprays by Both Nostrils route daily.  b complex vitamins tablet Take 1 Tab by mouth daily.  methylPREDNISolone (MEDROL DOSEPACK) 4 mg tablet Take as directed (Patient not taking: Reported on 7/16/2021)    diclofenac EC (VOLTAREN) 50 mg EC tablet Take 1 Tab by mouth two (2) times daily as needed for Pain. (Patient not taking: Reported on 1/10/2022)    ALPRAZolam (XANAX) 0.5 mg tablet  (Patient not taking: Reported on 7/16/2021)    fexofenadine (ALLEGRA) 180 mg tablet Take 180 mg by mouth daily.    (Patient not taking: Reported on 7/16/2021)     No current facility-administered medications for this visit.       PHYSICAL EXAMINATION:    Visit Vitals  /84   Pulse (!) 58   Temp 97.6 °F (36.4 °C) (Temporal)   Ht 5' 3\" (1.6 m)   Wt 162 lb (73.5 kg)   SpO2 97%   BMI 28.70 kg/m²       NEUROLOGICAL EXAMINATION:     Mental Status:   Alert and oriented to person, place, and time. Attention span and concentration are normal. Speech is fluent . Cranial Nerves:    II, III, IV, VI:  Visual acuity grossly intact. Visual fields are normal.    Pupils are equal, round, and reactive. Extra-ocular movements are full and fluid. V-XII: Hearing is grossly intact. Facial features are symmetric, with normal sensation and strength. The palate rises symmetrically and the tongue protrudes midline. Motor Examination: Normal tone, bulk and strength in all muscle groups of all extremities     Sensory exam:  Normal throughout     Coordination:  Finger to nose and rapid arm movement testing was normal.   No resting or intention tremor    Gait and Station:  Steady. Normal arm swing. No muscle wasting or fasiculations noted. DTRs hypoactive all over      LABS / IMAGING  MRI Results (most recent):  Results from [place] encounter on 07/27/21    MRI CERV SPINE WO CONT    Narrative  INDICATION:  Dx: Sciatica, right side [M54.31 (ICD-10-CM)]; Cervical  radiculopathy [M54.12 (ICD-10-CM)]; Left arm pain [M79.602 (ICD-10-CM)]    COMPARISON: None    TECHNIQUE: Multiplanar multisequence acquisition of the cervical spine. CONTRAST: None. FINDINGS:    ALIGNMENT:  Normal.  VERTEBRAL BODIES:  No compression fracture. MARROW SIGNAL: No significant edema. SPINAL CORD:  Normal course, caliber, and signal intensity. ADDITIONAL COMMENTS: N/A.    C2/3:  Spinal canal and foramina are patent. Incidental 2 mm extradural cyst in  the left lateral spinal canal may be a synovial cyst but exerts no mass effect  upon the foramen.     C3/4:  Mild disc bulge with uncovertebral spurring and facet arthropathy,  resulting in no significant spinal canal or foraminal stenosis. C4/5:  Posterior disc osteophyte complex with left-sided uncovertebral spurring  and facet arthropathy, but no significant spinal canal or foraminal stenosis. C5/6:  Right foraminal disc protrusion and uncovertebral spurring causes severe  right foraminal stenosis. Minimal if any rightward spinal canal stenosis, and  mild left foraminal stenosis. There is an 8 x 6 mm oval cystic structure in the  left foramen which appears to have a small connection to the facet joint (series  4 image 14). The nerve is somewhat difficult to accurately locate though may be  displaced anteriorly rather than coursing through the cyst..    C6/7:  Minimal disc bulge without significant spinal canal or foraminal  stenosis. C7/T1:   The spinal canal and foramina are widely patent. Impression  1. Severe right foraminal stenosis C5-6 due to foraminal disc  protrusion/uncovertebral spurring. 2. Oval 8 x 6 mm cystic structure filling the left C5-6 foramen as above, with  possible connection to the left facet joint which would suggest synovial cyst  rather than perineural cyst though difficult to confirm. 3. Mild degenerative changes in the remainder of the cervical spine as discussed  in detail above. Imaging reviewed    EMG showed mild chronic neurogenic changes in the left pronator teres    ASSESSMENT    ICD-10-CM ICD-9-CM    1. Cervical radiculopathy  M54.12 723.4        DISCUSSION  Ms. Jordy Basurto has symptoms of sciatica on the right side which subsided with physical therapy and epidural injections. She also has neck pain with intermittent tingling sensation in the left arm with mild chronic denervation seen in pronator teres muscle.   MRI shows a possible cystlike structure extending into the left C5-C6 neural foramen  She has been evaluated by neurosurgery and neuro interventional surgery and no intervention is indicated as she does not have any focal motor or sensory deficits. I concur with the same.    She should avoid activities that precipitate the symptoms and continue with stretching and strengthening exercises for the core neck muscles      Harley Ware MD  Diplomate, American Board of Psychiatry & Neurology (Neurology)  Diplomate, American Board of Psychiatry & Neurology (Clinical Neurophysiology)  Diplomate, American Board of Electrodiagnostic Medicine

## 2023-04-23 DIAGNOSIS — R92.8 ABNORMAL MAMMOGRAM: Primary | ICD-10-CM

## 2023-04-23 DIAGNOSIS — N63.0 BREAST NODULE: ICD-10-CM

## 2023-05-16 ENCOUNTER — HOSPITAL ENCOUNTER (OUTPATIENT)
Facility: HOSPITAL | Age: 68
Discharge: HOME | End: 2023-05-19
Payer: MEDICARE

## 2023-05-16 ENCOUNTER — TRANSCRIBE ORDERS (OUTPATIENT)
Facility: HOSPITAL | Age: 68
End: 2023-05-16

## 2023-05-16 DIAGNOSIS — N63.0 BREAST NODULE: ICD-10-CM

## 2023-05-16 DIAGNOSIS — Z12.31 VISIT FOR SCREENING MAMMOGRAM: Primary | ICD-10-CM

## 2023-05-16 DIAGNOSIS — R92.8 ABNORMAL MAMMOGRAM: ICD-10-CM

## 2023-05-16 PROCEDURE — G0279 TOMOSYNTHESIS, MAMMO: HCPCS

## 2023-05-16 PROCEDURE — 76642 ULTRASOUND BREAST LIMITED: CPT

## 2024-06-26 ENCOUNTER — TRANSCRIBE ORDERS (OUTPATIENT)
Facility: HOSPITAL | Age: 69
End: 2024-06-26

## 2024-06-26 DIAGNOSIS — Z12.31 VISIT FOR SCREENING MAMMOGRAM: Primary | ICD-10-CM

## 2024-10-02 ENCOUNTER — HOSPITAL ENCOUNTER (OUTPATIENT)
Facility: HOSPITAL | Age: 69
Discharge: HOME | End: 2024-10-05
Payer: MEDICARE

## 2024-10-02 VITALS — BODY MASS INDEX: 33.66 KG/M2 | HEIGHT: 63 IN | WEIGHT: 190 LBS

## 2024-10-02 DIAGNOSIS — Z12.31 VISIT FOR SCREENING MAMMOGRAM: ICD-10-CM

## 2024-10-02 PROCEDURE — 77063 BREAST TOMOSYNTHESIS BI: CPT
